# Patient Record
Sex: FEMALE | Race: WHITE | Employment: UNEMPLOYED | URBAN - METROPOLITAN AREA
[De-identification: names, ages, dates, MRNs, and addresses within clinical notes are randomized per-mention and may not be internally consistent; named-entity substitution may affect disease eponyms.]

---
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
## 2019-01-10 ENCOUNTER — OFFICE VISIT (OUTPATIENT)
Dept: OBGYN CLINIC | Age: 19
End: 2019-01-10

## 2019-01-10 VITALS
WEIGHT: 136 LBS | SYSTOLIC BLOOD PRESSURE: 110 MMHG | HEIGHT: 68 IN | DIASTOLIC BLOOD PRESSURE: 64 MMHG | BODY MASS INDEX: 20.61 KG/M2

## 2019-01-10 DIAGNOSIS — Z01.419 WELL WOMAN EXAM WITH ROUTINE GYNECOLOGICAL EXAM: Primary | ICD-10-CM

## 2019-01-10 NOTE — PROGRESS NOTES
Annual exam ages 21-44    Neetu Nunes is a No obstetric history on file. ,  22 y.o. female [facility] Patient's last menstrual period was 12/13/2018 (within days). .    She presents for her annual checkup. She is having no significant problems. At [address] same sex relationship. Markedly happier in current environment at school and home    With regard to the Gardasil vaccine, she has received all 3 injections. Menstrual status:    Her periods are spotting, light in flow. She is using three to five pads or tampons per day, 40-50. Cycles are irregular, but getting more regular. History of PCOS per patient. She denies dysmenorrhea. She reports no premenstrual symptoms. Contraception:    The current method of family planning is none and same sex relationship. Sexual history:     She  reports that she currently engages in sexual activity and has had partners who are Female. She reports using the following method of birth control/protection: None. .    Medical conditions:    Since her last annual GYN exam about one year ago, she has not the following changes in her health history: none. Pap and Mammogram History:    She has never had a pap smear. Breast Cancer History/Substance Abuse: negative    Past Medical History:   Diagnosis Date    ADHD (attention deficit hyperactivity disorder)     Anxiety     Depression     Migraines     Polycystic ovarian syndrome      Past Surgical History:   Procedure Laterality Date    HX WISDOM TEETH EXTRACTION         Current Outpatient Medications   Medication Sig Dispense Refill    TRAZODONE HCL (TRAZODONE PO) Take 75 mg by mouth.  Lisdexamfetamine (VYVANSE) 40 mg capsule Take by mouth daily.  sertraline (ZOLOFT) 100 mg tablet Take 150 mg by mouth daily. Allergies: Patient has no known allergies. Tobacco History:  reports that  has never smoked.  she has never used smokeless tobacco.  Alcohol Abuse:  reports that she does not drink alcohol. Drug Abuse:  reports that she does not use drugs.     Family Medical/Cancer History:   Family History   Problem Relation Age of Onset    Elevated Lipids Mother     Thyroid Disease Paternal Grandfather     Elevated Lipids Paternal Grandfather     Heart Disease Paternal Grandfather         Review of Systems - History obtained from the patient  Constitutional: negative for weight loss, fever, night sweats  HEENT: negative for hearing loss, earache, congestion, snoring, sorethroat  CV: negative for chest pain, palpitations, edema  Resp: negative for cough, shortness of breath, wheezing  GI: negative for change in bowel habits, abdominal pain, black or bloody stools  : negative for frequency, dysuria, hematuria, vaginal discharge  MSK: negative for back pain, joint pain, muscle pain  Breast: negative for breast lumps, nipple discharge, galactorrhea  Skin :negative for itching, rash, hives  Neuro: negative for dizziness, headache, confusion, weakness  Psych: negative for anxiety, depression, change in mood  Heme/lymph: negative for bleeding, bruising, pallor    Physical Exam    Visit Vitals  Ht 5' 8\" (1.727 m)   Wt 136 lb (61.7 kg)   LMP 12/13/2018 (Within Days)   BMI 20.68 kg/m²       Constitutional  · Appearance: well-nourished, well developed, alert, in no acute distress    HENT  · Head and Face: appears normal    Chest  · Respiratory Effort: breathing unlabored    Breasts  · Inspection of Breasts: breasts symmetrical, no skin changes, no discharge present, nipple appearance normal, no skin retraction present  · Palpation of Breasts and Axillae: no masses present on palpation, no breast tenderness  · Axillary Lymph Nodes: no lymphadenopathy present    Gastrointestinal  · Abdominal Examination: abdomen non-tender to palpation, normal bowel sounds, no masses present  · Liver and spleen: no hepatomegaly present, spleen not palpable  · Hernias: no hernias identified    Genitourinary  · deferred  ·      Skin  · General Inspection: no rash, no lesions identified    Neurologic/Psychiatric  · Mental Status:  · Orientation: grossly oriented to person, place and time  · Mood and Affect: mood normal, affect appropriate    . Assessment:  Routine gynecologic examination  Her current medical status is satisfactory with no evidence of significant gynecologic issues.     Plan:  GC/ chlamydia offered and declined  Will obtain pap at age 23  Counseled re: diet, exercise, healthy lifestyle  Return for yearly wellness visits  Gardisil counseling provided  Rec screening mammo at either 34 or 39

## 2020-12-30 NOTE — PROGRESS NOTES
Kvng Carrero [facility]  [address] [address], [address]  [phone]             Date of visit: 12/31/2020   Subjective:      History obtained from:  the patient. Eber Murcia is a 23 y.o. female who presents today for   Chief Complaint   Patient presents with    Abdominal Pain     This service was provided through telehealth (doxy. me real-time video/audio) due to COVID-19 pandemic, with the patient being at home and the provider being at [facility] in [place]. Others assisting in the telehealth encounter included none. 24 minutes were spent with the patient by the provider. she  and/or her healthcare decision maker is aware that this patient-initiated Telehealth encounter is a billable service, with coverage as determined by her insurance carrier. she  is aware that she  may receive a bill and has provided verbal consent to proceed: Yes, per PSR. New patient to me; I see her mom, Brion Oppenheim, and her sister, Kimberly Parsons.     Has had some strange health stuff and was wanting a pcp  Has had stomach issues and muscle pains at weird times    Since early high school (when she started having migraines) started having stomach problems  The migraines go through flare ups  Lots of unknown triggers, they are still trying to figure out what those are  When she gets a migraine she might be out for a day in a dark room  Seeing a child neurologist and on a preventive med and has a lot of different triptans    Will have episodes of abd pain  Might last 2 weeks  Will have times when she has a lot of pain, will be doubled over, then will be fine for weeks  Going to do an allergy panel soon with a holistic doctor and will do lab panels and celiac  Gets pain and vomiting  When she has this has to eat very soft/bland foods  Feels some better after she empties out  Doesn't get much diarrhea, just a little  Hasn't been as frequent but has stopped eating dairy and eggs because those are the main triggers. However, getting pains with other foods, too. Weight is 140-145 range  Lost weight since the abdominal pain started but thinks some of it is her vyvanse    Doesn't particularly like the vyanse but takes it for ADHD when she has to for school    In school but taking semester off and will do study abroad ([address])  Going to "[employer] in [place] for [employer]    Has tried tums, probiotics, nothing helps much    Sees gyn Dr. Anabella Bowles  Has not had first pap smear yet but plans to do so  Has completed Gardasil series    Recurrent muscle cramping, anywhere in her body  Last night was in neck, happened when she was turning head  Has to sit, rest, ice or heat  Did hurt neck in middle school, saw chiropractor and got adjustment that helped  These pains happen most days, since high school    Rarely etoh (triggers her migraines)  Avoids sugary drinks  cbd helps her migraines  Uses MJ occasionally (never tried it until last year so was after she got the abd pains)    Periods are infrequent and painful (has a period every other month, sometimes will go 3-4 months, uses provera to induce a period)  Was told PCOS in middle school, many women in her family have that (no hirsutism, denies much acne)  Tried OCPs but messed up her emotional balance    Patient Active Problem List    Diagnosis Date Noted    Migraine without aura and without status migrainosus, not intractable 12/31/2020    Attention deficit hyperactivity disorder (ADHD) 12/31/2020    PCOS (polycystic ovarian syndrome) 12/31/2020    Myalgia 12/31/2020    Recurrent abdominal pain 12/31/2020     Current Outpatient Medications   Medication Sig Dispense Refill    propranoloL (INDERAL) 10 mg tablet Take 10 mg by mouth two (2) times a day.  Vyvanse 20 mg capsule Take 20 mg by mouth daily as needed.  traZODone (DESYREL) 150 mg tablet Take 150 mg by mouth nightly.        No Known Allergies  Past Medical History:   Diagnosis Date    ADHD (attention deficit hyperactivity disorder)     Anxiety     Depression     Migraines     Polycystic ovarian syndrome      Past Surgical History:   Procedure Laterality Date    HX WISDOM TEETH EXTRACTION       Family History   Problem Relation Age of Onset    Elevated Lipids Mother     Thyroid Disease Paternal Grandfather     Elevated Lipids Paternal Grandfather     Heart Disease Paternal Grandfather      Social History     Tobacco Use    Smoking status: Never Smoker    Smokeless tobacco: Never Used   Substance Use Topics    Alcohol use: No      Social History     Social History Narrative    Not on file        Review of Systems  Gen: denies fever  All: denies known allergies  Pulm: denies cough   denies urinary pain  GI denies bleeding  Neuro: admits to headaches as above  Endo: denies weight loss  ENT denies cold symptoms  MSK: admits to muscle pains, as above  Psych:  3 most recent PHQ Screens 12/31/2020   Little interest or pleasure in doing things Not at all   Feeling down, depressed, irritable, or hopeless Not at all   Total Score PHQ 2 0            Objective: There were no vitals filed for this visit. (virtual visit)  There is no height or weight on file to calculate BMI. General: stated age, well developed, well nourished and in NAD  Skin:  No lesions noted on video  Psych: alert and oriented to person, place, time and situation and Speech: appropriate quality, quantity and organization of sentences   And normal affect    Assessment/Plan:       ICD-10-CM ICD-9-CM    1. Recurrent abdominal pain  R10.9 789.00    2. Myalgia  M79.10 729.1    3. PCOS (polycystic ovarian syndrome)  E28.2 256.4    4. Chronic insomnia  F51.04 780.52    5. Attention deficit hyperactivity disorder (ADHD), unspecified ADHD type  F90.9 314.01    6.  Migraine without aura and without status migrainosus, not intractable  G43.009 346.10         Orders Placed This Encounter    propranoloL (INDERAL) 10 mg tablet    Vyvanse 20 mg capsule    traZODone (DESYREL) 150 mg tablet       Suspect abdominal migraines  Want to do labs  She will send picture of labs ordered by holistic doctor first  Then I will order additional  The muscle cramps really sound excessive, too  The migraines are some better with propranolol but far from perfect  Thinking amitriptyline may help all these problems but want to do labs before starting  Could replace her trazodone  Discussed if not getting better may need to see GI doctor  She declines further treatment of PCOS as OCPs made her feel badly and she is not concerned about pregnancy    Discussed the diagnosis and plan and she expressed understanding. Follow-up and Dispositions    · Return in about 3 months (around 3/31/2021) for Follow up.        Efrain Acosta MD

## 2020-12-31 ENCOUNTER — VIRTUAL VISIT (OUTPATIENT)
Dept: FAMILY MEDICINE CLINIC | Age: 20
End: 2020-12-31
Payer: COMMERCIAL

## 2020-12-31 DIAGNOSIS — E28.2 PCOS (POLYCYSTIC OVARIAN SYNDROME): ICD-10-CM

## 2020-12-31 DIAGNOSIS — F51.04 CHRONIC INSOMNIA: ICD-10-CM

## 2020-12-31 DIAGNOSIS — R10.9 RECURRENT ABDOMINAL PAIN: Primary | ICD-10-CM

## 2020-12-31 DIAGNOSIS — G43.009 MIGRAINE WITHOUT AURA AND WITHOUT STATUS MIGRAINOSUS, NOT INTRACTABLE: ICD-10-CM

## 2020-12-31 DIAGNOSIS — M79.10 MYALGIA: ICD-10-CM

## 2020-12-31 DIAGNOSIS — F90.9 ATTENTION DEFICIT HYPERACTIVITY DISORDER (ADHD), UNSPECIFIED ADHD TYPE: ICD-10-CM

## 2020-12-31 PROCEDURE — 99204 OFFICE O/P NEW MOD 45 MIN: CPT | Performed by: FAMILY MEDICINE

## 2020-12-31 RX ORDER — PROPRANOLOL HYDROCHLORIDE 10 MG/1
10 TABLET ORAL 2 TIMES DAILY
COMMUNITY
Start: 2020-12-19

## 2020-12-31 RX ORDER — TRAZODONE HYDROCHLORIDE 150 MG/1
150 TABLET ORAL
COMMUNITY
Start: 2020-10-12

## 2020-12-31 RX ORDER — LISDEXAMFETAMINE DIMESYLATE 20 MG/1
20 CAPSULE ORAL
COMMUNITY
Start: 2020-10-12

## 2020-12-31 NOTE — PATIENT INSTRUCTIONS
Abdominal Pain: Care Instructions Your Care Instructions Abdominal pain has many possible causes. Some aren't serious and get better on their own in a few days. Others need more testing and treatment. If your pain continues or gets worse, you need to be rechecked and may need more tests to find out what is wrong. You may need surgery to correct the problem. Don't ignore new symptoms, such as fever, nausea and vomiting, urination problems, pain that gets worse, and dizziness. These may be signs of a more serious problem. Your doctor may have recommended a follow-up visit in the next 8 to 12 hours. If you are not getting better, you may need more tests or treatment. The doctor has checked you carefully, but problems can develop later. If you notice any problems or new symptoms, get medical treatment right away. Follow-up care is a key part of your treatment and safety. Be sure to make and go to all appointments, and call your doctor if you are having problems. It's also a good idea to know your test results and keep a list of the medicines you take. How can you care for yourself at home? · Rest until you feel better. · To prevent dehydration, drink plenty of fluids, enough so that your urine is light yellow or clear like water. Choose water and other caffeine-free clear liquids until you feel better. If you have kidney, heart, or liver disease and have to limit fluids, talk with your doctor before you increase the amount of fluids you drink. · If your stomach is upset, eat mild foods, such as rice, dry toast or crackers, bananas, and applesauce. Try eating several small meals instead of two or three large ones. · Wait until 48 hours after all symptoms have gone away before you have spicy foods, alcohol, and drinks that contain caffeine. · Do not eat foods that are high in fat. · Avoid anti-inflammatory medicines such as aspirin, ibuprofen (Advil, Motrin), and naproxen (Aleve). These can cause stomach upset. Talk to your doctor if you take daily aspirin for another health problem. When should you call for help? Call 911 anytime you think you may need emergency care. For example, call if: 
  · You passed out (lost consciousness).  
  · You pass maroon or very bloody stools.  
  · You vomit blood or what looks like coffee grounds.  
  · You have new, severe belly pain. Call your doctor now or seek immediate medical care if: 
  · Your pain gets worse, especially if it becomes focused in one area of your belly.  
  · You have a new or higher fever.  
  · Your stools are black and look like tar, or they have streaks of blood.  
  · You have unexpected vaginal bleeding.  
  · You have symptoms of a urinary tract infection. These may include: 
? Pain when you urinate. ? Urinating more often than usual. 
? Blood in your urine.  
  · You are dizzy or lightheaded, or you feel like you may faint. Watch closely for changes in your health, and be sure to contact your doctor if: 
  · You are not getting better after 1 day (24 hours). Where can you learn more? Go to http://www.gray.com/ Enter S063 in the search box to learn more about \"Abdominal Pain: Care Instructions. \" Current as of: June 26, 2019               Content Version: 12.6 © 3082-2638 [facility], Incorporated. Care instructions adapted under license by [employer] (which disclaims liability or warranty for this information). If you have questions about a medical condition or this instruction, always ask your healthcare professional. [address] any warranty or liability for your use of this information.

## 2022-03-18 PROBLEM — M79.10 MYALGIA: Status: ACTIVE | Noted: 2020-12-31

## 2022-03-18 PROBLEM — F90.9 ATTENTION DEFICIT HYPERACTIVITY DISORDER (ADHD): Status: ACTIVE | Noted: 2020-12-31

## 2022-03-19 PROBLEM — R10.9 RECURRENT ABDOMINAL PAIN: Status: ACTIVE | Noted: 2020-12-31

## 2022-03-19 PROBLEM — G43.009 MIGRAINE WITHOUT AURA AND WITHOUT STATUS MIGRAINOSUS, NOT INTRACTABLE: Status: ACTIVE | Noted: 2020-12-31

## 2022-03-19 PROBLEM — E28.2 PCOS (POLYCYSTIC OVARIAN SYNDROME): Status: ACTIVE | Noted: 2020-12-31

## 2023-07-03 ENCOUNTER — OFFICE VISIT (OUTPATIENT)
Age: 23
End: 2023-07-03
Payer: COMMERCIAL

## 2023-07-03 VITALS — DIASTOLIC BLOOD PRESSURE: 80 MMHG | WEIGHT: 150 LBS | SYSTOLIC BLOOD PRESSURE: 118 MMHG

## 2023-07-03 DIAGNOSIS — Z01.419 WELL WOMAN EXAM: Primary | ICD-10-CM

## 2023-07-03 PROCEDURE — 99395 PREV VISIT EST AGE 18-39: CPT | Performed by: OBSTETRICS & GYNECOLOGY

## 2023-07-03 RX ORDER — PROPRANOLOL HYDROCHLORIDE 80 MG/1
80 TABLET ORAL 3 TIMES DAILY
COMMUNITY

## 2023-07-03 RX ORDER — ARIPIPRAZOLE 2 MG/1
2 TABLET ORAL DAILY
COMMUNITY

## 2023-07-03 RX ORDER — LISDEXAMFETAMINE DIMESYLATE 40 MG/1
CAPSULE ORAL
COMMUNITY

## 2023-07-03 RX ORDER — SERTRALINE HYDROCHLORIDE 100 MG/1
100 TABLET, FILM COATED ORAL DAILY
COMMUNITY

## 2023-07-03 NOTE — PROGRESS NOTES
Annual exam    Nereida Larson is a 20 y.o. presenting for annual exam. Her main concerns today none. Recently graduated from college; now working at [employer]. Menses fairly regular      Ob/Gyn Hx:    -    LMP - Patient's last menstrual period was 2023 (exact date).   Menses - regular with light to heavy flow  Contraception - none  STI - declines  SA - not currently but has been in same sex relationship    Health maintenance:  Pap - never had  Gardasil -3/3 given      Past Medical History:   Diagnosis Date    ADHD (attention deficit hyperactivity disorder)     Anxiety     Depression     Migraines     Polycystic ovarian syndrome        Past Surgical History:   Procedure Laterality Date    WISDOM TOOTH EXTRACTION         Family History   Problem Relation Age of Onset    Heart Disease Paternal Grandfather     Thyroid Disease Paternal Grandfather     Elevated Lipids Paternal Grandfather     Elevated Lipids Mother        Social History     Socioeconomic History    Marital status: Single     Spouse name: Not on file    Number of children: Not on file    Years of education: Not on file    Highest education level: Not on file   Occupational History    Not on file   Tobacco Use    Smoking status: Never    Smokeless tobacco: Never   Substance and Sexual Activity    Alcohol use: No    Drug use: No    Sexual activity: Not on file   Other Topics Concern    Not on file   Social History Narrative    Not on file     Social Determinants of Health     Financial Resource Strain: Not on file   Food Insecurity: Not on file   Transportation Needs: Not on file   Physical Activity: Not on file   Stress: Not on file   Social Connections: Not on file   Intimate Partner Violence: Not on file   Housing Stability: Not on file       Current Outpatient Medications   Medication Sig Dispense Refill    ARIPiprazole (ABILIFY) 2 MG tablet Take 1 tablet by mouth daily      sertraline (ZOLOFT) 100 MG tablet Take 1 tablet by mouth

## 2024-07-29 ENCOUNTER — HOSPITAL ENCOUNTER (OUTPATIENT)
Facility: HOSPITAL | Age: 24
Discharge: HOME | End: 2024-08-01

## 2024-07-29 LAB
ALBUMIN SERPL-MCNC: 4.1 G/DL (ref 3.5–5)
ALBUMIN/GLOB SERPL: 1.2 (ref 1.1–2.2)
ALP SERPL-CCNC: 83 U/L (ref 45–117)
ALT SERPL-CCNC: 21 U/L (ref 12–78)
ANION GAP SERPL CALC-SCNC: 5 MMOL/L (ref 5–15)
AST SERPL-CCNC: 12 U/L (ref 15–37)
BASOPHILS # BLD: 0 K/UL (ref 0–0.1)
BASOPHILS NFR BLD: 0 % (ref 0–1)
BILIRUB SERPL-MCNC: 0.4 MG/DL (ref 0.2–1)
BUN SERPL-MCNC: 10 MG/DL (ref 6–20)
BUN/CREAT SERPL: 12 (ref 12–20)
CALCIUM SERPL-MCNC: 10 MG/DL (ref 8.5–10.1)
CHLORIDE SERPL-SCNC: 106 MMOL/L (ref 97–108)
CO2 SERPL-SCNC: 26 MMOL/L (ref 21–32)
CREAT SERPL-MCNC: 0.86 MG/DL (ref 0.55–1.02)
DIFFERENTIAL METHOD BLD: ABNORMAL
EOSINOPHIL # BLD: 0.8 K/UL (ref 0–0.4)
EOSINOPHIL NFR BLD: 12 % (ref 0–7)
ERYTHROCYTE [DISTWIDTH] IN BLOOD BY AUTOMATED COUNT: 12.3 % (ref 11.5–14.5)
GLOBULIN SER CALC-MCNC: 3.5 G/DL (ref 2–4)
GLUCOSE SERPL-MCNC: 102 MG/DL (ref 65–100)
HCT VFR BLD AUTO: 43.5 % (ref 35–47)
HGB BLD-MCNC: 14.4 G/DL (ref 11.5–16)
IMM GRANULOCYTES # BLD AUTO: 0 K/UL (ref 0–0.04)
IMM GRANULOCYTES NFR BLD AUTO: 0 % (ref 0–0.5)
LYMPHOCYTES # BLD: 1.8 K/UL (ref 0.8–3.5)
LYMPHOCYTES NFR BLD: 26 % (ref 12–49)
MAGNESIUM SERPL-MCNC: 1.9 MG/DL (ref 1.6–2.4)
MCH RBC QN AUTO: 29.1 PG (ref 26–34)
MCHC RBC AUTO-ENTMCNC: 33.1 G/DL (ref 30–36.5)
MCV RBC AUTO: 88.1 FL (ref 80–99)
MONOCYTES # BLD: 0.4 K/UL (ref 0–1)
MONOCYTES NFR BLD: 5 % (ref 5–13)
NEUTS SEG # BLD: 4 K/UL (ref 1.8–8)
NEUTS SEG NFR BLD: 57 % (ref 32–75)
NRBC # BLD: 0 K/UL (ref 0–0.01)
NRBC BLD-RTO: 0 PER 100 WBC
PLATELET # BLD AUTO: 299 K/UL (ref 150–400)
PMV BLD AUTO: 11.2 FL (ref 8.9–12.9)
POTASSIUM SERPL-SCNC: 4.3 MMOL/L (ref 3.5–5.1)
PROT SERPL-MCNC: 7.6 G/DL (ref 6.4–8.2)
RBC # BLD AUTO: 4.94 M/UL (ref 3.8–5.2)
SODIUM SERPL-SCNC: 137 MMOL/L (ref 136–145)
T4 FREE SERPL-MCNC: 1.1 NG/DL (ref 0.8–1.5)
TSH SERPL DL<=0.05 MIU/L-ACNC: 1.75 UIU/ML (ref 0.36–3.74)
WBC # BLD AUTO: 7.1 K/UL (ref 3.6–11)

## 2024-07-30 LAB — LAMOTRIGINE SERPL-MCNC: 4.4 UG/ML (ref 2–20)

## 2024-08-08 ENCOUNTER — HOSPITAL ENCOUNTER (EMERGENCY)
Facility: HOSPITAL | Age: 24
Discharge: HOME | End: 2024-08-08
Attending: EMERGENCY MEDICINE
Payer: COMMERCIAL

## 2024-08-08 VITALS
WEIGHT: 184.53 LBS | OXYGEN SATURATION: 99 % | TEMPERATURE: 98.1 F | SYSTOLIC BLOOD PRESSURE: 107 MMHG | DIASTOLIC BLOOD PRESSURE: 68 MMHG | HEART RATE: 88 BPM | RESPIRATION RATE: 20 BRPM

## 2024-08-08 DIAGNOSIS — E86.0 DEHYDRATION: ICD-10-CM

## 2024-08-08 DIAGNOSIS — R55 SYNCOPE AND COLLAPSE: Primary | ICD-10-CM

## 2024-08-08 LAB
ALBUMIN SERPL-MCNC: 4.2 G/DL (ref 3.5–5)
ALBUMIN/GLOB SERPL: 1.3 (ref 1.1–2.2)
ALP SERPL-CCNC: 71 U/L (ref 45–117)
ALT SERPL-CCNC: 18 U/L (ref 12–78)
ANION GAP SERPL CALC-SCNC: 9 MMOL/L (ref 5–15)
AST SERPL-CCNC: 19 U/L (ref 15–37)
BASOPHILS # BLD: 0 K/UL (ref 0–0.1)
BASOPHILS NFR BLD: 0 % (ref 0–1)
BILIRUB SERPL-MCNC: 0.3 MG/DL (ref 0.2–1)
BUN SERPL-MCNC: 10 MG/DL (ref 6–20)
BUN/CREAT SERPL: 10 (ref 12–20)
CALCIUM SERPL-MCNC: 9.5 MG/DL (ref 8.5–10.1)
CHLORIDE SERPL-SCNC: 103 MMOL/L (ref 97–108)
CO2 SERPL-SCNC: 30 MMOL/L (ref 21–32)
CREAT SERPL-MCNC: 1.05 MG/DL (ref 0.55–1.02)
DIFFERENTIAL METHOD BLD: ABNORMAL
EOSINOPHIL # BLD: 0.8 K/UL (ref 0–0.4)
EOSINOPHIL NFR BLD: 10 % (ref 0–7)
ERYTHROCYTE [DISTWIDTH] IN BLOOD BY AUTOMATED COUNT: 12.6 % (ref 11.5–14.5)
GLOBULIN SER CALC-MCNC: 3.3 G/DL (ref 2–4)
GLUCOSE SERPL-MCNC: 127 MG/DL (ref 65–100)
HCT VFR BLD AUTO: 39.6 % (ref 35–47)
HGB BLD-MCNC: 13.2 G/DL (ref 11.5–16)
IMM GRANULOCYTES # BLD AUTO: 0 K/UL (ref 0–0.04)
IMM GRANULOCYTES NFR BLD AUTO: 0 % (ref 0–0.5)
LYMPHOCYTES # BLD: 3.4 K/UL (ref 0.8–3.5)
LYMPHOCYTES NFR BLD: 39 % (ref 12–49)
MAGNESIUM SERPL-MCNC: 2 MG/DL (ref 1.6–2.4)
MCH RBC QN AUTO: 29.1 PG (ref 26–34)
MCHC RBC AUTO-ENTMCNC: 33.3 G/DL (ref 30–36.5)
MCV RBC AUTO: 87.4 FL (ref 80–99)
MONOCYTES # BLD: 0.6 K/UL (ref 0–1)
MONOCYTES NFR BLD: 7 % (ref 5–13)
NEUTS SEG # BLD: 3.9 K/UL (ref 1.8–8)
NEUTS SEG NFR BLD: 44 % (ref 32–75)
NRBC # BLD: 0 K/UL (ref 0–0.01)
NRBC BLD-RTO: 0 PER 100 WBC
PLATELET # BLD AUTO: 258 K/UL (ref 150–400)
PMV BLD AUTO: 11.3 FL (ref 8.9–12.9)
POTASSIUM SERPL-SCNC: 4 MMOL/L (ref 3.5–5.1)
PROT SERPL-MCNC: 7.5 G/DL (ref 6.4–8.2)
RBC # BLD AUTO: 4.53 M/UL (ref 3.8–5.2)
SODIUM SERPL-SCNC: 142 MMOL/L (ref 136–145)
TROPONIN I SERPL HS-MCNC: <4 NG/L (ref 0–51)
WBC # BLD AUTO: 8.7 K/UL (ref 3.6–11)

## 2024-08-08 PROCEDURE — 83735 ASSAY OF MAGNESIUM: CPT

## 2024-08-08 PROCEDURE — 84484 ASSAY OF TROPONIN QUANT: CPT

## 2024-08-08 PROCEDURE — 80053 COMPREHEN METABOLIC PANEL: CPT

## 2024-08-08 PROCEDURE — 93005 ELECTROCARDIOGRAM TRACING: CPT | Performed by: EMERGENCY MEDICINE

## 2024-08-08 PROCEDURE — 2580000003 HC RX 258: Performed by: EMERGENCY MEDICINE

## 2024-08-08 PROCEDURE — 96360 HYDRATION IV INFUSION INIT: CPT

## 2024-08-08 PROCEDURE — 99284 EMERGENCY DEPT VISIT MOD MDM: CPT

## 2024-08-08 PROCEDURE — 36415 COLL VENOUS BLD VENIPUNCTURE: CPT

## 2024-08-08 PROCEDURE — 85025 COMPLETE CBC W/AUTO DIFF WBC: CPT

## 2024-08-08 RX ORDER — TRAZODONE HYDROCHLORIDE 100 MG/1
1 TABLET ORAL
COMMUNITY

## 2024-08-08 RX ORDER — 0.9 % SODIUM CHLORIDE 0.9 %
1000 INTRAVENOUS SOLUTION INTRAVENOUS ONCE
Status: COMPLETED | OUTPATIENT
Start: 2024-08-08 | End: 2024-08-08

## 2024-08-08 RX ORDER — LAMOTRIGINE 25 MG/1
50 TABLET ORAL DAILY
COMMUNITY
Start: 2024-06-12

## 2024-08-08 RX ADMIN — SODIUM CHLORIDE 1000 ML: 9 INJECTION, SOLUTION INTRAVENOUS at 20:39

## 2024-08-08 ASSESSMENT — PAIN DESCRIPTION - LOCATION: LOCATION: CHEST

## 2024-08-08 ASSESSMENT — PAIN DESCRIPTION - DESCRIPTORS: DESCRIPTORS: TIGHTNESS

## 2024-08-08 ASSESSMENT — PAIN SCALES - GENERAL: PAINLEVEL_OUTOF10: 4

## 2024-08-08 ASSESSMENT — PAIN DESCRIPTION - ORIENTATION: ORIENTATION: MID

## 2024-08-08 ASSESSMENT — PAIN - FUNCTIONAL ASSESSMENT: PAIN_FUNCTIONAL_ASSESSMENT: 0-10

## 2024-08-09 LAB
EKG ATRIAL RATE: 93 BPM
EKG DIAGNOSIS: NORMAL
EKG P AXIS: 40 DEGREES
EKG P-R INTERVAL: 156 MS
EKG Q-T INTERVAL: 316 MS
EKG QRS DURATION: 86 MS
EKG QTC CALCULATION (BAZETT): 392 MS
EKG R AXIS: 50 DEGREES
EKG T AXIS: 42 DEGREES
EKG VENTRICULAR RATE: 93 BPM

## 2024-08-09 NOTE — ED NOTES
Ms. Alvarado stated that she is feeling better. Smiling. Discharge instructions provided, verbalized understanding and need for follow up with PCP and cardiology. AOX4, steady gait.

## 2024-08-09 NOTE — ED TRIAGE NOTES
Patient presents ambulatory to ER with complaints of syncope tonight. Did not fall was sitting at time. Reports currently dizzy with numbness down whole left side of body and chest tightness. Reports HR was running high at home today and then dropped to 50 right before syncope episode. Is following with [facility] due to similar events in the past but this time was worse. Currently wearing heart monitor also.

## 2024-08-09 NOTE — ED PROVIDER NOTES
[facility] EMERGENCY CTR  EMERGENCY DEPARTMENT ENCOUNTER      Pt Name: Flex Alvarado  MRN: [id number]  Birthdate 2000  Date of evaluation: 8/8/2024  Provider: Suraj Thurman DO      HISTORY OF PRESENT ILLNESS      HPI  23-year-old female presents to the emergency department complaining of syncopal episode this evening.  She has a history of multiple prior episodes like this and is currently following with [facility] cardiology for evaluation of this.  She is currently wearing a Holter monitor.  She states that this evening she began feeling lightheadedness and tingling sensation diffusely and chest tightness and felt like her heart was running quickly and then dropped into the 50s before she reportedly had a syncopal episode.  She states that she is feeling better now.  She states that she has had some recent changes in her lamotrigine dosing as well as smoking medical marijuana just prior to the onset of her symptoms.  She denies any focal numbness or weakness currently and no chest pain.  She admits to feeling anxious prior to the onset of her symptoms.      Nursing Notes were reviewed.    REVIEW OF SYSTEMS         Review of Systems        PAST MEDICAL HISTORY     Past Medical History:   Diagnosis Date    ADHD (attention deficit hyperactivity disorder)     Anxiety     Depression     Migraines     Polycystic ovarian syndrome          SURGICAL HISTORY       Past Surgical History:   Procedure Laterality Date    WISDOM TOOTH EXTRACTION           CURRENT MEDICATIONS       Previous Medications    ARIPIPRAZOLE (ABILIFY) 2 MG TABLET    Take 1 tablet by mouth daily    LAMOTRIGINE (LAMICTAL) 25 MG TABLET    Take 2 tablets by mouth daily    LISDEXAMFETAMINE DIMESYLATE (VYVANSE) 40 MG CAPS    Take by mouth.    PROPRANOLOL (INDERAL) 80 MG TABLET    Take 1 tablet by mouth 3 times daily    SERTRALINE (ZOLOFT) 100 MG TABLET    Take 1 tablet by mouth daily    TRAZODONE (DESYREL) 100 MG TABLET    Take 1 tablet by mouth nightly  No tenderness.      Cervical back: Neck supple.   Skin:     General: Skin is warm and dry.   Neurological:      General: No focal deficit present.      Mental Status: She is alert and oriented to person, place, and time.   Psychiatric:         Mood and Affect: Mood is anxious.             EMERGENCY DEPARTMENT COURSE and DIFFERENTIAL DIAGNOSIS/MDM:   Vitals:    Vitals:    08/08/24 2115 08/08/24 2145 08/08/24 2148 08/08/24 2151   BP: 108/70 107/68     Pulse: 85 95 88 88   Resp: 18 14 17 20   Temp:       TempSrc:       SpO2: 100% 99% 99% 99%   Weight:             Medical Decision Making  Amount and/or Complexity of Data Reviewed  Labs: ordered.  ECG/medicine tests: ordered.    Risk  Prescription drug management.      Well-appearing but anxious 23-year-old female presents to the emergency department after she had a syncopal episode.  History of similar and currently being evaluated by cardiology with Holter monitor in place.  Clinically appears slightly dehydrated and given IV fluid bolus and was reassessed and found to have improvement in symptoms.  Labs returned reassuringly showing no significant abnormalities other than mildly elevated creatinine at 1.05, consistent with dehydration.  Normal electrolytes, negative troponin, no leukocytosis or anemia.  Reassurance was given.  She was recommended follow-up with her cardiologist at [facility] for further evaluation and return precautions given for worsening or concerns.  This was discussed with the patient and her mother at the bedside and they stated with understanding and agreement.      REASSESSMENT     ED Course as of 08/08/24 2024   Thu Aug 08, 2024   2022 8:17 PM EKG shows normal sinus rhythm with a rate of 93 bpm with no acute ST or T wave abnormality suggestive of ischemia. [WJ]      ED Course User Index  [WJ] Suraj Thurman,          CONSULTS:  None    PROCEDURES:     Procedures        (Please note that portions of this note were completed with a voice recognition